# Patient Record
Sex: FEMALE | Race: WHITE
[De-identification: names, ages, dates, MRNs, and addresses within clinical notes are randomized per-mention and may not be internally consistent; named-entity substitution may affect disease eponyms.]

---

## 2017-12-07 ENCOUNTER — HOSPITAL ENCOUNTER (INPATIENT)
Dept: HOSPITAL 92 - SURG A | Age: 52
LOS: 6 days | Discharge: HOME | DRG: 743 | End: 2017-12-13
Attending: OBSTETRICS & GYNECOLOGY | Admitting: OBSTETRICS & GYNECOLOGY
Payer: COMMERCIAL

## 2017-12-07 ENCOUNTER — HOSPITAL ENCOUNTER (OUTPATIENT)
Dept: HOSPITAL 92 - LABBT | Age: 52
Discharge: HOME | End: 2017-12-07
Attending: OBSTETRICS & GYNECOLOGY
Payer: COMMERCIAL

## 2017-12-07 VITALS — BODY MASS INDEX: 21.6 KG/M2

## 2017-12-07 DIAGNOSIS — I10: ICD-10-CM

## 2017-12-07 DIAGNOSIS — N85.01: Primary | ICD-10-CM

## 2017-12-07 DIAGNOSIS — N93.9: ICD-10-CM

## 2017-12-07 DIAGNOSIS — N85.00: ICD-10-CM

## 2017-12-07 DIAGNOSIS — Z01.812: Primary | ICD-10-CM

## 2017-12-07 DIAGNOSIS — G43.909: ICD-10-CM

## 2017-12-07 DIAGNOSIS — Z85.3: ICD-10-CM

## 2017-12-07 DIAGNOSIS — N84.0: ICD-10-CM

## 2017-12-07 LAB
HCT VFR BLD CALC: 37.6 % (ref 36–47)
RBC # BLD AUTO: 3.91 MILL/UL (ref 4.2–5.4)
WBC # BLD AUTO: 5.4 THOU/UL (ref 4.8–10.8)

## 2017-12-07 PROCEDURE — 36415 COLL VENOUS BLD VENIPUNCTURE: CPT

## 2017-12-07 PROCEDURE — 93010 ELECTROCARDIOGRAM REPORT: CPT

## 2017-12-07 PROCEDURE — 86850 RBC ANTIBODY SCREEN: CPT

## 2017-12-07 PROCEDURE — 86900 BLOOD TYPING SEROLOGIC ABO: CPT

## 2017-12-07 PROCEDURE — 88307 TISSUE EXAM BY PATHOLOGIST: CPT

## 2017-12-07 PROCEDURE — 85027 COMPLETE CBC AUTOMATED: CPT

## 2017-12-07 PROCEDURE — 93005 ELECTROCARDIOGRAM TRACING: CPT

## 2017-12-07 PROCEDURE — A4216 STERILE WATER/SALINE, 10 ML: HCPCS

## 2017-12-07 PROCEDURE — 86901 BLOOD TYPING SEROLOGIC RH(D): CPT

## 2017-12-07 NOTE — HP
DATE OF SURGERY:  She is set for surgery on 12/12/2017.

 

HISTORY OF PRESENT ILLNESS:  Ms. Guillen is a 52-year-old white female with a history of estrogen-r
eceptor positive left breast cancer treated with lumpectomy and radiation along with tamoxifen.  The 
diagnosis was in 2008.  She was seen by my nurse midwife for abnormal uterine bleeding and was evalua
govind recently with endometrial biopsy and ultrasound.  Endometrial biopsy showed complex hyperplasia w
ithout atypia and also evidence of endometrial polyp.  Due to this finding, the patient is desiring d
efinitive surgical therapy.

 

PAST MEDICAL HISTORY:  As noted, her past medical history is significant for the left breast cancer w
ciril[place] was estrogen-receptor positive and she has been without evidence of recurrence since that time i
n 2008.  She also has chronic hypertension and migraine headaches.

 

CURRENT MEDICATIONS:  Bisoprolol and hydrochlorothiazide 2.5/6.25 mg, sumatriptan succinate 100 mg as
 needed for migraine headaches.

 

PAST SURGICAL HISTORY:  Lumpectomy, XRT of left breast 2008 with 5 years postsurgical tamoxifen thera
py.

 

SOCIAL HISTORY:  Non-smoker, minimal alcohol use.

 

ALLERGIES:  She has no known drug allergies.

 

PHYSICAL EXAMINATION:

VITAL SIGNS:  Her blood pressure is 130/80, pulse 81, respirations 16.  Height 65 inches, weight 137 
pounds, BMI 22.5.

HEENT:  Within normal limits.

NECK:  With no masses.  No thyromegaly.

CHEST:  Clear to auscultation.

HEART:  Regular rate and rhythm.  S1, S2 heart sounds, no murmurs, rubs or gallops.

ABDOMEN:  Soft, nontender, nondistended with no palpable masses.

PELVIC:  Vulva and vagina had no lesions.  Cervix had no lesions.  Uterus was nontender and small.  A
dnexa nontender with no masses.

 

ASSESSMENT:  This is a 52-year-old white female with abnormal uterine bleeding with endometrial biops
y showing complex hyperplasia with also polyp, no atypia noted.  The patient with tamoxifen use for 5
 years post breast cancer therapy with the ER-positive left breast cancer intraductal carcinoma.  Wit
h all of these findings, recommend definitive surgical therapy and we will proceed with robotic total
 laparoscopic hysterectomy and bilateral salpingo-oophorectomy.  Risks and benefits of procedure disc
ussed in detail, set for surgery on 12/12/2017.

## 2017-12-08 NOTE — EKG
Test Reason : 

Blood Pressure : ***/*** mmHG

Vent. Rate : 079 BPM     Atrial Rate : 079 BPM

   P-R Int : 114 ms          QRS Dur : 072 ms

    QT Int : 372 ms       P-R-T Axes : 019 079 048 degrees

   QTc Int : 426 ms

 

Normal sinus rhythm

Minimal voltage criteria for LVH, may be normal variant

Borderline ECG

No previous ECGs available

Confirmed by IVAN SOSA ([id number]) on 12/8/2017 12:20:49 PM

 

Referred By:  JOHANA           Confirmed By:IVAN SOSA

## 2017-12-12 PROCEDURE — 0UB24ZZ EXCISION OF BILATERAL OVARIES, PERCUTANEOUS ENDOSCOPIC APPROACH: ICD-10-PCS | Performed by: OBSTETRICS & GYNECOLOGY

## 2017-12-12 PROCEDURE — 0UT94ZZ RESECTION OF UTERUS, PERCUTANEOUS ENDOSCOPIC APPROACH: ICD-10-PCS | Performed by: OBSTETRICS & GYNECOLOGY

## 2017-12-12 PROCEDURE — 0UB74ZZ EXCISION OF BILATERAL FALLOPIAN TUBES, PERCUTANEOUS ENDOSCOPIC APPROACH: ICD-10-PCS | Performed by: OBSTETRICS & GYNECOLOGY

## 2017-12-12 PROCEDURE — 8E0W4CZ ROBOTIC ASSISTED PROCEDURE OF TRUNK REGION, PERCUTANEOUS ENDOSCOPIC APPROACH: ICD-10-PCS | Performed by: OBSTETRICS & GYNECOLOGY

## 2017-12-12 PROCEDURE — 0UTC4ZZ RESECTION OF CERVIX, PERCUTANEOUS ENDOSCOPIC APPROACH: ICD-10-PCS | Performed by: OBSTETRICS & GYNECOLOGY

## 2017-12-12 RX ADMIN — Medication SCH: at 21:27

## 2017-12-12 RX ADMIN — MORPHINE SULFATE PRN MG: 10 INJECTION, SOLUTION INTRAMUSCULAR; INTRAVENOUS at 12:29

## 2017-12-12 RX ADMIN — MORPHINE SULFATE PRN MG: 10 INJECTION, SOLUTION INTRAMUSCULAR; INTRAVENOUS at 19:51

## 2017-12-12 NOTE — OP
DATE OF PROCEDURE:  12/12/2017

 

PREOPERATIVE DIAGNOSES:

1.  A 52-year-old white female with complex endometrial hyperplasia without atypia.

2.  Endometrial polyp.

3.  Abnormal uterine bleeding.

4.  Prior Tamoxifen therapy for previous intraductal carcinoma of the left breast.

 

POSTOPERATIVE DIAGNOSES:

1.  A 52-year-old white female with complex endometrial hyperplasia without atypia.

2.  Endometrial polyp.

3.  Abnormal uterine bleeding.

4.  Prior Tamoxifen therapy for previous intraductal carcinoma of the left breast.

 

PROCEDURE PERFORMED:  Robotic total laparoscopic hysterectomy and bilateral salpingo-oophorectomy.

 

SURGEON:  Constanza Chaudhry M.D.

 

ASSISTANT:  Alexander Jarrell D.O.

 

ANESTHESIA:  General endotracheal.

 

ESTIMATED BLOOD LOSS:  50 mL.

 

COMPLICATIONS:  None.

 

COUNTS:  Correct x2.

 

ANTIBIOTICS:  Two grams Ancef on call to the operating room.

 

PATHOLOGY:  Uterus, cervix, bilateral tubes and ovaries.

 

FINDINGS:

1.  Normal appearing uterus with approximately 2-3 cm simple cyst in the left ovary, normal appearing
 bilateral fallopian tubes and right ovary.

2.  Clear urine present in Cormier catheter post-procedure.

3.  Bilateral ureteral peristalsis visualized post procedure.

 

DISPOSITION:  To the recovery room stable.

 

DESCRIPTION OF OPERATIVE PROCEDURE:  The patient previously received informed consent in regards to s
cristina.  She was taken back to the operating room where she received a general endotracheal anestheti
c agent without complications.  She was placed in the dorsal lithotomy position without use of Asad 
stirrups and prepped and draped in usual sterile fashion.  At this time, a side-arm speculum was plac
ed in the vagina.  Anterior lip of cervix grasped with a single-tooth tenaculum.  Cormier catheter had 
been placed.  The uterus sounded to 7 cm.  A size 6 cm COMFORT uterine manipulator with a 3.5 cm cervica
l cup was then placed in the usual fashion.  The tenaculum and speculum were removed.  Attention was 
then turned to the abdomen where perspective trocar sites were infiltrated with 0.5% Marcaine with ep
inephrine.  A 12 mm supraumbilical incision was made.  Veress needle was placed into the abdomen and 
the patient pressure was noted to be less than 5 mmHg.  Abdomen was insufflated to a patient pressure
 of 15 mm, approximately 4-1/2 liters of carbon dioxide gas.  The Veress needle was then removed.  A 
12 mm trocar was placed in the umbilical incision.  The robotic laparoscope was introduced through a 
trocar sleeve confirming proper entry.  The patient was placed in Trendelenburg position, additional 
bilateral lower quadrant 8 mm trocars were then placed along with the right upper quadrant 11 mm assi
stant port.  The robot was then docked.  I then proceeded to carry out the surgical procedure from th
e operative console and my assistants remained at the bedside.  The uterus was elevated, previously m
entioned findings were noted.  The left fallopian tube was grasped by my assistant with an atraumatic
 grasper and the bipolar fenestrated device was utilized to coagulate the infundibulopelvic ligament.
  It was then transected with monopolar scissors.  Serial coagulation transected the broad ligament c
utting close to the uterus was carried down to the left round ligament was reached.  It was coagulate
d and transected.  The anterior lip of the broad ligament was then entered and vesicouterine peritone
um was incised in a layering fashion, dissecting the bladder atraumatically past the cervical vaginal
 margin which was visualized from the COMFORT uterine manipulator cup indention.  The left uterine vesse
ls again were skeletonized and then they were coagulated internal cervical os region with bipolar fen
estrated cautery.

 

This was repeated in likewise fashion on the right side of the uterus.  Again, the right ovary was re
moved after it was elevated at the fimbria by my assistant.  The IP ligaments cauterized and then tra
nsected.  Serial coagulation and transection of the broad ligament, hugging close to the uterus was c
arried out again until the right round ligament was reached.  It was coagulated and transected again 
entering the anterior leaf of the broad ligament.  A layering technique of the vesicouterine peritone
um incising this was done with both sharp and blunt dissection, the bladder was dropped past the cerv
ical vaginal angle.  Right uterine vessels again were skeletonized and cauterized the internal cervic
al os region.  After this was completed, an anterior colpotomy was then created starting at 12-3 and 
12 to 9 o'clock position and then completed from the 6 to 9 and 6 to 3 o'clock in likewise fashion.  
The specimen was delivered into the vaginal vault.  The areas of bleeding were made hemostatic with t
he bipolar fenestrated cautery over the cuff line.

 

The Stratafix suture was then placed and brought in by my assistant through the right upper quadrant 
assistant port.  The needle  had been switched out with the monopolar scissors and then the vag
inal cuff was closed in full thickness starting at the left angle back to the right angle and then ba
ck towards the midline.  Hemostasis was confirmed.  The pelvis and the pedicle sites were suctioned a
nd irrigated again, hemostasis was noted along the operative field site.  Bilateral ureteral peristal
sis were visualized and the bladder was draining clear yellow urine.  The excess carbon dioxide gas w
as then released from the abdomen after the robot had been undocked and the trocar sleeves were remov
ed.  A deep stitch of 0 Vicryl suture was placed in the umbilical fascial defect and the remainder of
 the trocar sites were closed with 4-0 Monocryl in subcuticular fashion.  The vaginal vault was confi
rmed to be hemostatic and the patient was awakened from anesthesia and transferred to the recovery ro
om in stable condition.

## 2017-12-13 VITALS — TEMPERATURE: 98.3 F | DIASTOLIC BLOOD PRESSURE: 73 MMHG | SYSTOLIC BLOOD PRESSURE: 123 MMHG

## 2017-12-13 LAB
HCT VFR BLD CALC: 31.3 % (ref 36–47)
RBC # BLD AUTO: 3.22 MILL/UL (ref 4.2–5.4)
WBC # BLD AUTO: 6.5 THOU/UL (ref 4.8–10.8)

## 2017-12-13 RX ADMIN — Medication SCH ML: at 08:28

## 2017-12-14 NOTE — DIS
DATE OF ADMISSION:  12/12/2017

 

DATE OF DISCHARGE:  12/13/2017

 

DIAGNOSES:

1.  Abnormal uterine bleeding with endometrial hyperplasia.

2.  Endometrial polyp on endometrial biopsy.

3.  Uterine leiomyomata.

4.  History of tamoxifen use.

 

PROCEDURE PERFORMED:  Robotic total laparoscopic hysterectomy and bilateral salpingo-oophorectomy.

 

SUMMARY OF HOSPITAL COURSE:  Ms. Guillen is a 52-year-old white female with previous diagnosis of i
ntraductal carcinoma in 2008 of the left breast treated with lumpectomy, radiation, and adjuvant tamo
xifen.  She had been having episodes of abnormal uterine bleeding and endometrial biopsy showed compl
ex endometrial hyperplasia without atypia of any possible endometrial polyp and uterine leiomyomata. 
 Due to this finding, she underwent definitive surgical therapy with a robotic hysterectomy and bilat
eral salpingo-oophorectomy.

 

Postoperatively, the patient did well.  Her vital signs remained stable and she was afebrile.  Her he
matocrit was appropriate following postop day #1, at 32%.  She is ambulating, voiding without difficu
lty, and tolerating diet at the time of discharge.

 

DISCHARGE MEDICATIONS:   Tramadol 50 mg q. 6 hours p.r.n. pain, over-the-counter ibuprofen as directe
d.  She was told to also resume her maintenance medication, amlodipine, for her hypertension.

 

She has a followup in 3 and 6 weeks and pathology is pending at the time of this dictation.

## 2018-02-06 ENCOUNTER — HOSPITAL ENCOUNTER (OUTPATIENT)
Dept: HOSPITAL 92 - BICMAMMO | Age: 53
Discharge: HOME | End: 2018-02-06
Attending: ADVANCED PRACTICE MIDWIFE
Payer: COMMERCIAL

## 2018-02-06 DIAGNOSIS — Z12.31: Primary | ICD-10-CM

## 2018-02-06 DIAGNOSIS — Z85.3: ICD-10-CM

## 2018-02-06 PROCEDURE — 77063 BREAST TOMOSYNTHESIS BI: CPT

## 2018-02-06 PROCEDURE — 77067 SCR MAMMO BI INCL CAD: CPT
